# Patient Record
Sex: MALE | Race: WHITE | NOT HISPANIC OR LATINO
[De-identification: names, ages, dates, MRNs, and addresses within clinical notes are randomized per-mention and may not be internally consistent; named-entity substitution may affect disease eponyms.]

---

## 2018-11-24 PROBLEM — E03.9 HYPOTHYROIDISM: Status: ACTIVE | Noted: 2018-11-24

## 2018-11-24 PROBLEM — M87.051 AVASCULAR NECROSIS OF BONE OF RIGHT HIP: Status: RESOLVED | Noted: 2018-11-24 | Resolved: 2018-11-24

## 2018-11-24 PROBLEM — Z87.2 HISTORY OF ERYTHEMA MULTIFORME: Status: RESOLVED | Noted: 2018-11-24 | Resolved: 2018-11-24

## 2018-11-24 PROBLEM — Z87.438 HISTORY OF BPH: Status: RESOLVED | Noted: 2018-11-24 | Resolved: 2018-11-24

## 2018-11-24 PROBLEM — Z00.00 ENCOUNTER FOR PREVENTIVE HEALTH EXAMINATION: Status: ACTIVE | Noted: 2018-11-24

## 2018-11-24 RX ORDER — ZOLPIDEM TARTRATE 10 MG/1
10 TABLET ORAL
Refills: 0 | Status: ACTIVE | COMMUNITY

## 2018-11-24 RX ORDER — FLUTICASONE PROPIONATE 50 MCG
50 SPRAY, SUSPENSION NASAL TWICE DAILY
Refills: 0 | Status: ACTIVE | COMMUNITY

## 2018-11-24 RX ORDER — ALPRAZOLAM 1 MG/1
1 TABLET ORAL
Refills: 0 | Status: ACTIVE | COMMUNITY

## 2018-11-27 ENCOUNTER — NON-APPOINTMENT (OUTPATIENT)
Age: 63
End: 2018-11-27

## 2018-11-27 ENCOUNTER — APPOINTMENT (OUTPATIENT)
Dept: HEART AND VASCULAR | Facility: CLINIC | Age: 63
End: 2018-11-27
Payer: COMMERCIAL

## 2018-11-27 VITALS
BODY MASS INDEX: 22.39 KG/M2 | TEMPERATURE: 98.2 F | SYSTOLIC BLOOD PRESSURE: 106 MMHG | DIASTOLIC BLOOD PRESSURE: 64 MMHG | HEART RATE: 92 BPM | HEIGHT: 65.5 IN | OXYGEN SATURATION: 95 % | WEIGHT: 136 LBS

## 2018-11-27 DIAGNOSIS — F41.9 ANXIETY DISORDER, UNSPECIFIED: ICD-10-CM

## 2018-11-27 DIAGNOSIS — Z87.2 PERSONAL HISTORY OF DISEASES OF THE SKIN AND SUBCUTANEOUS TISSUE: ICD-10-CM

## 2018-11-27 DIAGNOSIS — F32.9 ANXIETY DISORDER, UNSPECIFIED: ICD-10-CM

## 2018-11-27 DIAGNOSIS — Z87.438 PERSONAL HISTORY OF OTHER DISEASES OF MALE GENITAL ORGANS: ICD-10-CM

## 2018-11-27 DIAGNOSIS — E03.9 HYPOTHYROIDISM, UNSPECIFIED: ICD-10-CM

## 2018-11-27 DIAGNOSIS — N40.0 BENIGN PROSTATIC HYPERPLASIA WITHOUT LOWER URINARY TRACT SYMPMS: ICD-10-CM

## 2018-11-27 DIAGNOSIS — M87.051 IDIOPATHIC ASEPTIC NECROSIS OF RIGHT FEMUR: ICD-10-CM

## 2018-11-27 PROCEDURE — 99214 OFFICE O/P EST MOD 30 MIN: CPT | Mod: 25

## 2018-11-27 PROCEDURE — 93000 ELECTROCARDIOGRAM COMPLETE: CPT

## 2018-11-27 RX ORDER — TAMSULOSIN HYDROCHLORIDE 0.4 MG/1
0.4 CAPSULE ORAL DAILY
Refills: 0 | Status: ACTIVE | COMMUNITY

## 2018-11-27 RX ORDER — AZELASTINE HYDROCHLORIDE 137 UG/1
0.1 SPRAY, METERED NASAL TWICE DAILY
Refills: 0 | Status: ACTIVE | COMMUNITY

## 2018-11-27 RX ORDER — ASCORBIC ACID 1000 MG
1000 TABLET, EXTENDED RELEASE ORAL
Refills: 0 | Status: ACTIVE | COMMUNITY

## 2018-11-27 RX ORDER — CHOLECALCIFEROL (VITAMIN D3) 50 MCG
5000 TABLET ORAL
Refills: 0 | Status: ACTIVE | COMMUNITY

## 2018-11-27 NOTE — REASON FOR VISIT
[Follow-Up - Clinic] : a clinic follow-up of [FreeTextEntry1] : \par Diagnostic tests:\par ---------------------------------------------\par ECG: \par 11/27/18: NSR, A-sensed, V-paced. \par 05/22/18: NSR, left bundle branch block. \par 01/03/17: NSR, left bundle branch block. \par 07/22/15: NSR, left bundle branch block.\par 04/14/15: NSR, left bundle branch block. \par 11/11/14: NSR, left bundle branch block. \par 02/20/13: NSR, left bundle branch block. \par ----------------------------------------------\par Echo: \par 06/01/17: EF 52%, trace AI, PPM.\par 07/10/15: outside lab: EF 60%, mild MR, trace AI, \par 03/04/13: EF 53%, trace TR\par 11/26/08: EF 60%, trace AI/TR\par ----------------------------------------------\par Stress:\par 07/10/15: at [facility]: regadenoson MPI stress: EF 60% normal perfusion\par 03/19/14: exercise stress echo: EF 55%, 10.6 METS, normal wall motion and PA pressures\par -----------------------------------------------\par Carotid:\par 07/10/15: ICA b/l 1-19%\par -----------------------------------------------\par Lower extremity veins:\par 01/03/17: sono: r/o DVT: left: normal study.\par -----------------------------------------------\par CT:\par 08/14/18: chest: bilateral peribronchial consolidation with bronchiectasis, ground glass opacities

## 2018-11-27 NOTE — PHYSICAL EXAM
[General Appearance - Well Developed] : well developed [Normal Appearance] : normal appearance [Well Groomed] : well groomed [General Appearance - Well Nourished] : well nourished [No Deformities] : no deformities [General Appearance - In No Acute Distress] : no acute distress [Normal Conjunctiva] : the conjunctiva exhibited no abnormalities [Eyelids - No Xanthelasma] : the eyelids demonstrated no xanthelasmas [Normal Oral Mucosa] : normal oral mucosa [No Oral Pallor] : no oral pallor [No Oral Cyanosis] : no oral cyanosis [Normal Jugular Venous A Waves Present] : normal jugular venous A waves present [Normal Jugular Venous V Waves Present] : normal jugular venous V waves present [No Jugular Venous Curtis A Waves] : no jugular venous curtis A waves [Respiration, Rhythm And Depth] : normal respiratory rhythm and effort [Exaggerated Use Of Accessory Muscles For Inspiration] : no accessory muscle use [Auscultation Breath Sounds / Voice Sounds] : lungs were clear to auscultation bilaterally [Heart Rate And Rhythm] : heart rate and rhythm were normal [Heart Sounds] : normal S1 and S2 [Murmurs] : no murmurs present [Abdomen Soft] : soft [Abdomen Tenderness] : non-tender [Abdomen Mass (___ Cm)] : no abdominal mass palpated [Abnormal Walk] : normal gait [Gait - Sufficient For Exercise Testing] : the gait was sufficient for exercise testing [Nail Clubbing] : no clubbing of the fingernails [Cyanosis, Localized] : no localized cyanosis [Petechial Hemorrhages (___cm)] : no petechial hemorrhages [Skin Color & Pigmentation] : normal skin color and pigmentation [] : no rash [No Venous Stasis] : no venous stasis [Skin Lesions] : no skin lesions [No Skin Ulcers] : no skin ulcer [No Xanthoma] : no  xanthoma was observed [Oriented To Time, Place, And Person] : oriented to person, place, and time [Affect] : the affect was normal [Mood] : the mood was normal [FreeTextEntry1] : + anxiety

## 2018-11-27 NOTE — ASSESSMENT
[FreeTextEntry1] :  \par 1. Sarcoidosis of lung:  Pulmonary sarcoidosis, with coincident left bundle branch block: s/p normal cardiac MRI and echocardiogram 12/08, normal repeat cardiac MRI 06/29/09, normal echo 03/04/13, normal cardiac MR, echo, stress MPI, cardiac CTA (07/2015). Follows with new pulmonologist Parmjit Lassiter  \par             - continue Symbicort\par             - continue Flonase Suspension, 50 MCG/ACT, 1 spray in each nostril\par             - f/u with pulmonologist, Parmjit Sevilla MD at [facility] [place]\par \par 2. Left bundle branch block: s/p syncope and + EPS with prolonged HV conduction, s/p PPM (Medtronic) 07/14/15 (Rosibel Freire MD at [facility]), + NSVT on recent interrogations. Echo 06/01/17: EF 52%, trace AI, PPM.\par             - follow up with PPM clinic at [facility]\par             - continue to monitor. \par \par 3. Dyslipidemia: \par             - continue Crestor 20mg po daily\par             - discussed with patient therapeutic lifestyle changes to improve lipid metabolism.\par             - recheck lipid profile fasting next draw\par \par 4. + TOBIN: ? sarcoidosis vs. cardiac etiology\par             - will send for an exercise stress echocardiogram

## 2018-11-27 NOTE — REVIEW OF SYSTEMS
[Dyspnea on exertion] : dyspnea during exertion [Cough] : cough [Anxiety] : anxiety [Negative] : Heme/Lymph [Shortness Of Breath] : shortness of breath

## 2018-11-27 NOTE — DISCUSSION/SUMMARY
[___ Month(s)] : [unfilled] month(s) [FreeTextEntry1] : Lior Lam MD, FACC, FASE, RPVI\par Director, Cardiovascular Service [facility]\par Director, Cardiology [facility]\par Associate , Cardiovascular Disease Fellowship\par [facility]\par Associate Professor of Medicine\par [employer] [facility]\par \par ([facility] office)\par [facility]\par [address], 3rd floor ([address] [address]\par (p) [phone]\par (f)  [phone]\par \par ([place] Office)\par [facility]\par [address]\par [address] [facility]\par (p) [phone]

## 2018-11-27 NOTE — HISTORY OF PRESENT ILLNESS
[FreeTextEntry1] : Mr. Houser presents for follow up and management of pulmonary sarcoidosis and left bundle branch block s/p PPM 07/14/15. He admits to a fair a mouth of stress at home. He and his wife have two adopted children from [place]. Both have had children without really being ready and his son has been diagnosed with bipolar disorder. On 07/09/15 he experienced a syncopal episode while standing in the kitchen. The episode was preceded by several seconds of light headedness. Then he awoke on the floor and felt back to his baseline other than a small head laceration. He then went to the ED at [facility] in [place]. He had a negative head CT and was observed for 24 hours. He had an echocardiogram, MPI stress test, CTA of the coronary arteries, and a carotid ultrasound all of which were negative per patient. He was then transferred to [facility] on Sunday secondary to LBBB and h/o sarcoidosis. He had a cardiac MR in 2016 which revealed an EF of 52% without evidence of scar or infiltration. He went on to have an EPS with Rosibel Freire MD which revealed prolongation of the HV interval with a procainamide challenge. He then went on to have a Medtronic A2DR01 (MRI compatible) dual chamber pacemaker implanted and is doing well since. His past device interrogation noted several episodes of NSVT. At present he denies any cardiovascular complaints. He continues to have episodes of "URI" and we discussed the need to follow up with his pulmonologist to rule out active pulmonary sarcoidosis. In addition, he continues to note a left sub-auricular lymph node enlargement. He underwent an echocardiogram on 06/01/17 which revealed EF 52%, trace AI, and a PPM. He is following with a pulmonologist in the sarcoidosis center at [place] and is now off prednisone.  In July, 2018 he noted his PPM pacing and his electrophysiologist reprogramed the pacemaker to allow for longer AV delays.  He has modified his diet and has lost 40 pounds as a result.

## 2018-12-04 ENCOUNTER — MEDICATION RENEWAL (OUTPATIENT)
Age: 63
End: 2018-12-04

## 2018-12-13 ENCOUNTER — MEDICATION RENEWAL (OUTPATIENT)
Age: 63
End: 2018-12-13

## 2019-01-07 ENCOUNTER — APPOINTMENT (OUTPATIENT)
Dept: HEART AND VASCULAR | Facility: CLINIC | Age: 64
End: 2019-01-07
Payer: COMMERCIAL

## 2019-01-07 PROCEDURE — 93351 STRESS TTE COMPLETE: CPT

## 2019-04-07 NOTE — REVIEW OF SYSTEMS
[Shortness Of Breath] : shortness of breath [Dyspnea on exertion] : dyspnea during exertion [Cough] : cough [Anxiety] : anxiety [Negative] : Heme/Lymph

## 2019-04-09 ENCOUNTER — APPOINTMENT (OUTPATIENT)
Dept: HEART AND VASCULAR | Facility: CLINIC | Age: 64
End: 2019-04-09
Payer: COMMERCIAL

## 2019-04-09 VITALS
DIASTOLIC BLOOD PRESSURE: 61 MMHG | SYSTOLIC BLOOD PRESSURE: 108 MMHG | OXYGEN SATURATION: 98 % | TEMPERATURE: 98 F | WEIGHT: 144 LBS | HEART RATE: 89 BPM | BODY MASS INDEX: 23.7 KG/M2 | HEIGHT: 65.5 IN

## 2019-04-09 PROCEDURE — 99214 OFFICE O/P EST MOD 30 MIN: CPT

## 2019-04-09 RX ORDER — DIAZEPAM 10 MG/1
10 TABLET ORAL DAILY
Qty: 15 | Refills: 2 | Status: ACTIVE | COMMUNITY
Start: 2018-11-27 | End: 1900-01-01

## 2019-04-09 NOTE — HISTORY OF PRESENT ILLNESS
[FreeTextEntry1] : Mr. Houser presents for follow up and management of pulmonary sarcoidosis and left bundle branch block s/p PPM 07/14/15. He admits to a fair a mouth of stress at home. He and his wife have two adopted children from [place]. Both have had children without really being ready and his son has been diagnosed with bipolar disorder. On 07/09/15 he experienced a syncopal episode while standing in the kitchen. The episode was preceded by several seconds of light headedness. Then he awoke on the floor and felt back to his baseline other than a small head laceration. He then went to the ED at [facility] in [place]. He had a negative head CT and was observed for 24 hours. He had an echocardiogram, MPI stress test, CTA of the coronary arteries, and a carotid ultrasound all of which were negative per patient. He was then transferred to [facility] on Sunday secondary to LBBB and h/o sarcoidosis. He had a cardiac MR in 2016 which revealed an EF of 52% without evidence of scar or infiltration. He went on to have an EPS with Rosibel Freire MD which revealed prolongation of the HV interval with a procainamide challenge. He then went on to have a Medtronic A2DR01 (MRI compatible) dual chamber pacemaker implanted and is doing well since. His past device interrogation noted several episodes of NSVT. At present he denies any cardiovascular complaints. He continues to have episodes of "URI" and we discussed the need to follow up with his pulmonologist to rule out active pulmonary sarcoidosis. In addition, he continues to note a left sub-auricular lymph node enlargement. He underwent an echocardiogram on 06/01/17 which revealed EF 52%, trace AI, and a PPM. He is following with a pulmonologist in the sarcoidosis center at [place] and is now off prednisone.  In July, 2018 he noted his PPM pacing and his electrophysiologist reprogramed the pacemaker to allow for longer AV delays.  He has modified his diet and has lost 40 pounds as a result.  He had an exercise stress echocardiogram on\par 01/07/19 which revealed an EF of 58%, PPM, aortic sclerosis, 10.1 METS, and normal wall motion and PA pressures.

## 2019-04-09 NOTE — REASON FOR VISIT
[Follow-Up - Clinic] : a clinic follow-up of [FreeTextEntry1] : Diagnostic Tests:\par ---------------------------------------------\par ECG: \par 11/27/18: NSR, A-sensed, V-paced. \par 05/22/18: NSR, left bundle branch block. \par 01/03/17: NSR, left bundle branch block. \par 07/22/15: NSR, left bundle branch block.\par 04/14/15: NSR, left bundle branch block. \par 11/11/14: NSR, left bundle branch block. \par 02/20/13: NSR, left bundle branch block. \par ----------------------------------------------\par Echo: \par 01/07/19: EF 58%, aortic sclerosis, PPM.\par 06/01/17: EF 52%, trace AI, PPM.\par 07/10/15: outside lab: EF 60%, mild MR, trace AI, \par 03/04/13: EF 53%, trace TR\par 11/26/08: EF 60%, trace AI/TR\par ----------------------------------------------\par Stress:\par 01/07/19: exercise stress echo: EF 58%, PPM, aortic sclerosis, 10.1 METS, normal wall motion and PA pressures.\par 07/10/15: at [facility]: regadenoson MPI stress: EF 60% normal perfusion\par 03/19/14: exercise stress echo: EF 55%, 10.6 METS, normal wall motion and PA pressures\par -----------------------------------------------\par Carotid:\par 07/10/15: ICA b/l 1-19%\par -----------------------------------------------\par Lower extremity veins:\par 01/03/17: sono: r/o DVT: left: normal study.\par -----------------------------------------------\par CT:\par 08/14/18: chest: bilateral peribronchial consolidation with bronchiectasis, ground glass opacities

## 2019-04-09 NOTE — PHYSICAL EXAM
[General Appearance - Well Developed] : well developed [Normal Appearance] : normal appearance [Well Groomed] : well groomed [General Appearance - Well Nourished] : well nourished [No Deformities] : no deformities [General Appearance - In No Acute Distress] : no acute distress [Normal Conjunctiva] : the conjunctiva exhibited no abnormalities [Eyelids - No Xanthelasma] : the eyelids demonstrated no xanthelasmas [Normal Oral Mucosa] : normal oral mucosa [No Oral Pallor] : no oral pallor [No Oral Cyanosis] : no oral cyanosis [Normal Jugular Venous A Waves Present] : normal jugular venous A waves present [Normal Jugular Venous V Waves Present] : normal jugular venous V waves present [No Jugular Venous Curtis A Waves] : no jugular venous curtis A waves [Respiration, Rhythm And Depth] : normal respiratory rhythm and effort [Exaggerated Use Of Accessory Muscles For Inspiration] : no accessory muscle use [Heart Rate And Rhythm] : heart rate and rhythm were normal [Heart Sounds] : normal S1 and S2 [Murmurs] : no murmurs present [Abdomen Soft] : soft [Abdomen Tenderness] : non-tender [Abdomen Mass (___ Cm)] : no abdominal mass palpated [Abnormal Walk] : normal gait [Gait - Sufficient For Exercise Testing] : the gait was sufficient for exercise testing [Nail Clubbing] : no clubbing of the fingernails [Cyanosis, Localized] : no localized cyanosis [Petechial Hemorrhages (___cm)] : no petechial hemorrhages [Skin Color & Pigmentation] : normal skin color and pigmentation [] : no rash [No Venous Stasis] : no venous stasis [Skin Lesions] : no skin lesions [No Skin Ulcers] : no skin ulcer [No Xanthoma] : no  xanthoma was observed [Oriented To Time, Place, And Person] : oriented to person, place, and time [Affect] : the affect was normal [Mood] : the mood was normal [FreeTextEntry1] : + anxiety

## 2019-04-09 NOTE — ASSESSMENT
[FreeTextEntry1] : 1. Sarcoidosis of lung:  Pulmonary sarcoidosis, with coincident left bundle branch block: s/p normal cardiac MRI and echocardiogram 12/08, normal repeat cardiac MRI 06/29/09, normal echo 03/04/13, normal cardiac MR, echo, stress MPI, cardiac CTA (07/2015). Follows with new pulmonologist Parmjit Lassiter  \par             - continue Symbicort\par             - continue Flonase Suspension, 50 MCG/ACT, 1 spray in each nostril\par             - f/u with pulmonologist, Parmjit Se[place]illa [place] [facility]\[place] \par 2. Left bundle branch block: s/p syncope and + EPS with prolonged HV conduction, s/p PPM (Medtronic) 07/14/15 (Rosibel Freire MD at [facility]), + NSVT on recent interrogations. Echo 06/01/17: EF 52%, trace AI, PPM.\par             - follow up with PPM clinic at [facility]\par             - continue to monitor. \par \par 3. Dyslipidemia: lab work from 01/2019 had an LDL of 165 but he was off Crestor at the time\par             - continue Crestor 20mg po daily\par             - discussed with patient therapeutic lifestyle changes to improve lipid metabolism.\par             - recheck lipid profile with PMD in near future\par \par 4. + TOBIN: ? sarcoidosis vs. cardiac etiology, s/p 01/07/19: exercise stress echo: EF 58%, PPM, aortic sclerosis, 10.1 METS, normal wall motion and PA pressures.\par             - f/u with pulmonologist

## 2019-08-26 ENCOUNTER — APPOINTMENT (OUTPATIENT)
Dept: HEART AND VASCULAR | Facility: CLINIC | Age: 64
End: 2019-08-26
Payer: COMMERCIAL

## 2019-08-26 ENCOUNTER — NON-APPOINTMENT (OUTPATIENT)
Age: 64
End: 2019-08-26

## 2019-08-26 VITALS
DIASTOLIC BLOOD PRESSURE: 64 MMHG | HEIGHT: 66.5 IN | BODY MASS INDEX: 24.14 KG/M2 | OXYGEN SATURATION: 95 % | WEIGHT: 152 LBS | TEMPERATURE: 98.3 F | SYSTOLIC BLOOD PRESSURE: 111 MMHG | HEART RATE: 93 BPM

## 2019-08-26 DIAGNOSIS — D69.3 IMMUNE THROMBOCYTOPENIC PURPURA: ICD-10-CM

## 2019-08-26 PROCEDURE — 99214 OFFICE O/P EST MOD 30 MIN: CPT | Mod: 25

## 2019-08-26 PROCEDURE — 93000 ELECTROCARDIOGRAM COMPLETE: CPT

## 2019-08-26 NOTE — ASSESSMENT
[FreeTextEntry1] : 1. Sarcoidosis of lung:  Pulmonary sarcoidosis, with coincident left bundle branch block: s/p normal cardiac MRI and echocardiogram 12/08, normal repeat cardiac MRI 06/29/09, normal echo 03/04/13, normal cardiac MR, echo, stress MPI, cardiac CTA (07/2015). Follows with new pulmonologist Parmjit Lassiter  \par             - continue Symbicort\par             - continue Flonase Suspension, 50 MCG/ACT, 1 spray in each nostril\par             - f/u with pulmonologist, Parmjit [place] [facility]\[place] \par 2. Left bundle branch block: s/p syncope and + EPS with prolonged HV conduction, s/p PPM (Medtronic) 07/14/15 (Rosibel Freire MD at [facility]), + NSVT on recent interrogations. Echo 06/01/17: EF 52%, trace AI, PPM.\par             - follow up with PPM clinic at [facility]\par             - continue to monitor. \par \par 3. Dyslipidemia: lab work from 01/2019 had an LDL of 165 but he was off Crestor at the time, now with optimal LDL\par             - continue Crestor 20mg po daily\par             - discussed with patient therapeutic lifestyle changes to improve lipid metabolism.\par             - consider trial of half dose Vascepa\par \par 4. + TOBIN: ? sarcoidosis vs. cardiac etiology, s/p 01/07/19: exercise stress echo: EF 58%, PPM, aortic sclerosis, 10.1 METS, normal wall motion and PA pressures.\par             - f/u with pulmonologist

## 2019-08-26 NOTE — PHYSICAL EXAM
[General Appearance - Well Developed] : well developed [Well Groomed] : well groomed [Normal Appearance] : normal appearance [No Deformities] : no deformities [General Appearance - In No Acute Distress] : no acute distress [General Appearance - Well Nourished] : well nourished [Normal Conjunctiva] : the conjunctiva exhibited no abnormalities [Eyelids - No Xanthelasma] : the eyelids demonstrated no xanthelasmas [No Oral Cyanosis] : no oral cyanosis [Normal Oral Mucosa] : normal oral mucosa [No Oral Pallor] : no oral pallor [Normal Jugular Venous V Waves Present] : normal jugular venous V waves present [Normal Jugular Venous A Waves Present] : normal jugular venous A waves present [No Jugular Venous Curtis A Waves] : no jugular venous curtis A waves [Exaggerated Use Of Accessory Muscles For Inspiration] : no accessory muscle use [Respiration, Rhythm And Depth] : normal respiratory rhythm and effort [Heart Rate And Rhythm] : heart rate and rhythm were normal [Heart Sounds] : normal S1 and S2 [Murmurs] : no murmurs present [Abdomen Soft] : soft [Abdomen Tenderness] : non-tender [Abnormal Walk] : normal gait [Gait - Sufficient For Exercise Testing] : the gait was sufficient for exercise testing [Abdomen Mass (___ Cm)] : no abdominal mass palpated [Cyanosis, Localized] : no localized cyanosis [Nail Clubbing] : no clubbing of the fingernails [Skin Color & Pigmentation] : normal skin color and pigmentation [Petechial Hemorrhages (___cm)] : no petechial hemorrhages [No Venous Stasis] : no venous stasis [] : no rash [Skin Lesions] : no skin lesions [No Xanthoma] : no  xanthoma was observed [No Skin Ulcers] : no skin ulcer [Affect] : the affect was normal [Oriented To Time, Place, And Person] : oriented to person, place, and time [Mood] : the mood was normal [FreeTextEntry1] : + anxiety

## 2019-08-26 NOTE — HISTORY OF PRESENT ILLNESS
[FreeTextEntry1] : Mr. Houser presents for follow up and management of pulmonary sarcoidosis and left bundle branch block s/p PPM 07/14/15. He admits to a fair a mouth of stress at home. He and his wife have two adopted children from [place]. Both have had children without really being ready and his son has been diagnosed with bipolar disorder. On 07/09/15 he experienced a syncopal episode while standing in the kitchen. The episode was preceded by several seconds of light headedness. Then he awoke on the floor and felt back to his baseline other than a small head laceration. He then went to the ED at [facility] in [place]. He had a negative head CT and was observed for 24 hours. He had an echocardiogram, MPI stress test, CTA of the coronary arteries, and a carotid ultrasound all of which were negative per patient. He was then transferred to [facility] on Sunday secondary to LBBB and h/o sarcoidosis. He had a cardiac MR in 2016 which revealed an EF of 52% without evidence of scar or infiltration. He went on to have an EPS with Rosibel Freire MD which revealed prolongation of the HV interval with a procainamide challenge. He then went on to have a Medtronic A2DR01 (MRI compatible) dual chamber pacemaker implanted and is doing well since. His past device interrogation noted several episodes of NSVT. At present he denies any cardiovascular complaints. He continues to have episodes of "URI" and we discussed the need to follow up with his pulmonologist to rule out active pulmonary sarcoidosis. In addition, he continues to note a left sub-auricular lymph node enlargement. He underwent an echocardiogram on 06/01/17 which revealed EF 52%, trace AI, and a PPM. He is following with a pulmonologist in the sarcoidosis center at [place] and is now off prednisone.  In July, 2018 he noted his PPM pacing and his electrophysiologist reprogramed the pacemaker to allow for longer AV delays.  He has modified his diet and has lost 40 pounds as a result.  He had an exercise stress echocardiogram on\par 01/07/19 which revealed an EF of 58%, PPM, aortic sclerosis, 10.1 METS, and normal wall motion and PA pressures.  He has complaints of sinus congestion.  His PMD initiated Vascepa and he discontinued it secondary to hand arthralgia.

## 2019-08-26 NOTE — REASON FOR VISIT
[Follow-Up - Clinic] : a clinic follow-up of [FreeTextEntry1] : Diagnostic Tests:\par ---------------------------------------------\par ECG: \par 08/26/19: NSR, A-sensed, V-paced. \par 11/27/18: NSR, A-sensed, V-paced. \par 05/22/18: NSR, left bundle branch block. \par 01/03/17: NSR, left bundle branch block. \par 07/22/15: NSR, left bundle branch block.\par 04/14/15: NSR, left bundle branch block. \par 11/11/14: NSR, left bundle branch block. \par 02/20/13: NSR, left bundle branch block. \par ----------------------------------------------\par Echo: \par 01/07/19: EF 58%, aortic sclerosis, PPM.\par 06/01/17: EF 52%, trace AI, PPM.\par 07/10/15: outside lab: EF 60%, mild MR, trace AI, \par 03/04/13: EF 53%, trace TR\par 11/26/08: EF 60%, trace AI/TR\par ----------------------------------------------\par Stress:\par 01/07/19: exercise stress echo: EF 58%, PPM, aortic sclerosis, 10.1 METS, normal wall motion and PA pressures.\par 07/10/15: at [place]: regadenoson MPI stress: EF 60% normal perfusion\par 03/19/14: exercise stress echo: EF 55%, 10.6 METS, normal wall motion and PA pressures\par -----------------------------------------------\par Carotid:\par 07/10/15: ICA b/l 1-19%\par -----------------------------------------------\par Lower extremity veins:\par 01/03/17: sono: r/o DVT: left: normal study.\par -----------------------------------------------\par CT:\par 08/14/18: chest: bilateral peribronchial consolidation with bronchiectasis, ground glass opacities

## 2019-11-26 ENCOUNTER — APPOINTMENT (OUTPATIENT)
Dept: HEART AND VASCULAR | Facility: CLINIC | Age: 64
End: 2019-11-26
Payer: COMMERCIAL

## 2019-11-26 VITALS
HEART RATE: 82 BPM | WEIGHT: 153 LBS | SYSTOLIC BLOOD PRESSURE: 104 MMHG | DIASTOLIC BLOOD PRESSURE: 64 MMHG | OXYGEN SATURATION: 96 % | BODY MASS INDEX: 24.3 KG/M2 | HEIGHT: 66.5 IN

## 2019-11-26 DIAGNOSIS — K21.9 GASTRO-ESOPHAGEAL REFLUX DISEASE W/OUT ESOPHAGITIS: ICD-10-CM

## 2019-11-26 PROCEDURE — 99214 OFFICE O/P EST MOD 30 MIN: CPT

## 2019-11-26 NOTE — ASSESSMENT
[FreeTextEntry1] : 1. Sarcoidosis of lung:  Pulmonary sarcoidosis, with coincident left bundle branch block: s/p normal cardiac MRI and echocardiogram 12/08, normal repeat cardiac MRI 06/29/09, normal echo 03/04/13, normal cardiac MR, echo, stress MPI, cardiac CTA (07/2015). Follows with new pulmonologist Parmjit Lassitre  \par             - continue Symbicort\par             - continue Flonase Suspension, 50 MCG/ACT, 1 spray in each nostril\par             - f/u with pulmonologist, [place] [facility]\[place] \par 2. Left bundle branch block: s/p syncope and + EPS with prolonged HV conduction, s/p PPM (Medtronic) 07/14/15 (Rosibel Freire MD at [facility]), + NSVT on recent interrogations. Echo 06/01/17: EF 52%, trace AI, PPM.\par             - follow up with PPM clinic at [facility]\par             - continue to monitor. \par \par 3. Dyslipidemia: lab work from 01/2019 had an LDL of 165 but he was off Crestor at the time, now with optimal LDL\par             - continue Crestor 20mg po daily\par             - discussed with patient therapeutic lifestyle changes to improve lipid metabolism.\par             - consider trial of half dose Vascepa\par \par 4. + TOBIN: ? sarcoidosis vs. cardiac etiology, s/p 01/07/19: exercise stress echo: EF 58%, PPM, aortic sclerosis, 10.1 METS, normal wall motion and PA pressures.\par             - f/u with pulmonologist

## 2019-11-26 NOTE — REASON FOR VISIT
[FreeTextEntry1] : Diagnostic Tests:\par ---------------------------------------------\par ECG: \par 08/26/19: NSR, A-sensed, V-paced. \par 11/27/18: NSR, A-sensed, V-paced. \par 05/22/18: NSR, left bundle branch block. \par 01/03/17: NSR, left bundle branch block. \par 07/22/15: NSR, left bundle branch block.\par 04/14/15: NSR, left bundle branch block. \par 11/11/14: NSR, left bundle branch block. \par 02/20/13: NSR, left bundle branch block. \par ----------------------------------------------\par Echo: \par 01/07/19: EF 58%, aortic sclerosis, PPM.\par 06/01/17: EF 52%, trace AI, PPM.\par 07/10/15: outside lab: EF 60%, mild MR, trace AI, \par 03/04/13: EF 53%, trace TR\par 11/26/08: EF 60%, trace AI/TR\par ----------------------------------------------\par Stress:\par 01/07/19: exercise stress echo: EF 58%, PPM, aortic sclerosis, 10.1 METS, normal wall motion and PA pressures.\par 07/10/15: at [facility]: regadenoson MPI stress: EF 60% normal perfusion\par 03/19/14: exercise stress echo: EF 55%, 10.6 METS, normal wall motion and PA pressures\par -----------------------------------------------\par Carotid:\par 07/10/15: ICA b/l 1-19%\par -----------------------------------------------\par Lower extremity veins:\par 01/03/17: sono: r/o DVT: left: normal study.\par -----------------------------------------------\par CT:\par 08/14/18: chest: bilateral peribronchial consolidation with bronchiectasis, ground glass opacities

## 2019-11-26 NOTE — HISTORY OF PRESENT ILLNESS
[FreeTextEntry1] : Mr. Houser presents for follow up and management of pulmonary sarcoidosis and left bundle branch block s/p PPM 07/14/15. He admits to a fair a mouth of stress at home. He and his wife have two adopted children from [place]. Both have had children without really being ready and his son has been diagnosed with bipolar disorder. On 07/09/15 he experienced a syncopal episode while standing in the kitchen. The episode was preceded by several seconds of light headedness. Then he awoke on the floor and felt back to his baseline other than a small head laceration. He then went to the ED at [facility] in [place]. He had a negative head CT and was observed for 24 hours. He had an echocardiogram, MPI stress test, CTA of the coronary arteries, and a carotid ultrasound all of which were negative per patient. He was then transferred to [facility] on Sunday secondary to LBBB and h/o sarcoidosis. He had a cardiac MR in 2016 which revealed an EF of 52% without evidence of scar or infiltration. He went on to have an EPS with Rosibel Freire MD which revealed prolongation of the HV interval with a procainamide challenge. He then went on to have a Medtronic A2DR01 (MRI compatible) dual chamber pacemaker implanted and is doing well since. His past device interrogation noted several episodes of NSVT. At present he denies any cardiovascular complaints. He continues to have episodes of "URI" and we discussed the need to follow up with his pulmonologist to rule out active pulmonary sarcoidosis. In addition, he continues to note a left sub-auricular lymph node enlargement. He underwent an echocardiogram on 06/01/17 which revealed EF 52%, trace AI, and a PPM. He is following with a pulmonologist in the sarcoidosis center at [place] and is now off prednisone.  In July, 2018 he noted his PPM pacing and his electrophysiologist reprogramed the pacemaker to allow for longer AV delays.  He has modified his diet and has lost 40 pounds as a result.  He had an exercise stress echocardiogram on\par 01/07/19 which revealed an EF of 58%, PPM, aortic sclerosis, 10.1 METS, and normal wall motion and PA pressures.  He has complaints of sinus congestion.  His PMD initiated Vascepa and he discontinued it secondary to hand arthralgia.

## 2020-01-03 ENCOUNTER — RX RENEWAL (OUTPATIENT)
Age: 65
End: 2020-01-03

## 2020-01-03 RX ORDER — ROSUVASTATIN CALCIUM 20 MG/1
20 TABLET, FILM COATED ORAL DAILY
Qty: 90 | Refills: 3 | Status: ACTIVE | COMMUNITY
Start: 1900-01-01 | End: 1900-01-01

## 2020-02-25 ENCOUNTER — NON-APPOINTMENT (OUTPATIENT)
Age: 65
End: 2020-02-25

## 2020-02-25 ENCOUNTER — APPOINTMENT (OUTPATIENT)
Dept: HEART AND VASCULAR | Facility: CLINIC | Age: 65
End: 2020-02-25
Payer: COMMERCIAL

## 2020-02-25 VITALS
SYSTOLIC BLOOD PRESSURE: 109 MMHG | WEIGHT: 156 LBS | DIASTOLIC BLOOD PRESSURE: 70 MMHG | HEIGHT: 66.5 IN | BODY MASS INDEX: 24.78 KG/M2 | OXYGEN SATURATION: 95 % | HEART RATE: 97 BPM

## 2020-02-25 PROCEDURE — 99214 OFFICE O/P EST MOD 30 MIN: CPT | Mod: 25

## 2020-02-25 PROCEDURE — 93000 ELECTROCARDIOGRAM COMPLETE: CPT

## 2020-02-26 NOTE — ASSESSMENT
[FreeTextEntry1] : 1. Sarcoidosis of lung:  Pulmonary sarcoidosis, with coincident left bundle branch block: s/p normal cardiac MRI and echocardiogram 12/08, normal repeat cardiac MRI 06/29/09, normal echo 03/04/13, normal cardiac MR, echo, stress MPI, cardiac CTA (07/2015). Follows with new pulmonologist Parmjit Lassiter  \par             - continue Symbicort\par             - continue Flonase Suspension, 50 MCG/ACT, 1 spray in each nostril\par             - [place] [facility] [place] \par 2. Left bundle branch block: s/p syncope and + EPS with prolonged HV conduction, s/p PPM (Medtronic) 07/14/15 (Rosibel Freire MD at [facility]), + NSVT on recent interrogations. Echo 06/01/17: EF 52%, trace AI, PPM.\par             - follow up with PPM clinic at [facility]\par             - continue to monitor. \par             - will send for an echocardiogram \par \par 3. Dyslipidemia: lab work from 01/2019 had an LDL of 165 but he was off Crestor at the time, now with optimal LDL\par             - continue Crestor 20mg po daily\par             - discussed with patient therapeutic lifestyle changes to improve lipid metabolism.\par \par 4. + TOBIN: ? sarcoidosis vs. cardiac etiology, s/p 01/07/19: exercise stress echo: EF 58%, PPM, aortic sclerosis, 10.1 METS, normal wall motion and PA pressures.\par             - f/u with pulmonologist

## 2020-03-04 ENCOUNTER — NEW PATIENT (OUTPATIENT)
Dept: URBAN - METROPOLITAN AREA CLINIC 46 | Facility: CLINIC | Age: 65
End: 2020-03-04

## 2020-03-04 DIAGNOSIS — H04.123: ICD-10-CM

## 2020-03-04 DIAGNOSIS — H43.812: ICD-10-CM

## 2020-03-04 DIAGNOSIS — H25.13: ICD-10-CM

## 2020-03-04 DIAGNOSIS — H18.51: ICD-10-CM

## 2020-03-04 DIAGNOSIS — D86.9: ICD-10-CM

## 2020-03-04 PROCEDURE — 99243 OFF/OP CNSLTJ NEW/EST LOW 30: CPT

## 2020-03-04 PROCEDURE — 92201 OPSCPY EXTND RTA DRAW UNI/BI: CPT

## 2020-03-04 ASSESSMENT — VISUAL ACUITY
OD_CC: 20/25
OS_CC: 20/60
OD_CC: 20/40
OS_CC: 20/30

## 2020-03-04 ASSESSMENT — TONOMETRY
OD_IOP_MMHG: 15
OS_IOP_MMHG: 15

## 2020-04-17 NOTE — HISTORY OF PRESENT ILLNESS
[FreeTextEntry1] : Mr. Houser presents for follow up and management of pulmonary sarcoidosis and left bundle branch block s/p PPM 07/14/15. He admits to a fair a mouth of stress at home. He and his wife have two adopted children from [place]. Both have had children without really being ready and his son has been diagnosed with bipolar disorder. On 07/09/15 he experienced a syncopal episode while standing in the kitchen. The episode was preceded by several seconds of light headedness. Then he awoke on the floor and felt back to his baseline other than a small head laceration. He then went to the ED at [facility] in [place]. He had a negative head CT and was observed for 24 hours. He had an echocardiogram, MPI stress test, CTA of the coronary arteries, and a carotid ultrasound all of which were negative per patient. He was then transferred to [facility] on Sunday secondary to LBBB and h/o sarcoidosis. He had a cardiac MR in 2016 which revealed an EF of 52% without evidence of scar or infiltration. He went on to have an EPS with Rosibel Freire MD which revealed prolongation of the HV interval with a procainamide challenge. He then went on to have a Medtronic A2DR01 (MRI compatible) dual chamber pacemaker implanted and is doing well since. His past device interrogation noted several episodes of NSVT. At present he denies any cardiovascular complaints. He continues to have episodes of "URI" and we discussed the need to follow up with his pulmonologist to rule out active pulmonary sarcoidosis. In addition, he continues to note a left sub-auricular lymph node enlargement. He underwent an echocardiogram on 06/01/17 which revealed EF 52%, trace AI, and a PPM. He is following with a pulmonologist in the sarcoidosis center at [place] and is now off prednisone.  In July, 2018 he noted his PPM pacing and his electrophysiologist reprogramed the pacemaker to allow for longer AV delays.  He has modified his diet and has lost 40 pounds as a result.  He had an exercise stress echocardiogram on\par 01/07/19 which revealed an EF of 58%, PPM, aortic sclerosis, 10.1 METS, and normal wall motion and PA pressures.  He has complaints of chronic sinus congestion.  [facility]

## 2020-06-01 ENCOUNTER — APPOINTMENT (OUTPATIENT)
Dept: HEART AND VASCULAR | Facility: CLINIC | Age: 65
End: 2020-06-01
Payer: COMMERCIAL

## 2020-06-01 VITALS — HEIGHT: 66.5 IN | WEIGHT: 155 LBS | BODY MASS INDEX: 24.62 KG/M2

## 2020-06-01 PROCEDURE — 99214 OFFICE O/P EST MOD 30 MIN: CPT | Mod: 95

## 2020-06-01 NOTE — PHYSICAL EXAM
[General Appearance - Well Developed] : well developed [Normal Appearance] : normal appearance [Well Groomed] : well groomed [General Appearance - Well Nourished] : well nourished [No Deformities] : no deformities [Eyelids - No Xanthelasma] : the eyelids demonstrated no xanthelasmas [Normal Conjunctiva] : the conjunctiva exhibited no abnormalities [General Appearance - In No Acute Distress] : no acute distress [Normal Oral Mucosa] : normal oral mucosa [No Oral Pallor] : no oral pallor [No Oral Cyanosis] : no oral cyanosis [Normal Jugular Venous V Waves Present] : normal jugular venous V waves present [Normal Jugular Venous A Waves Present] : normal jugular venous A waves present [No Jugular Venous Curtis A Waves] : no jugular venous curtis A waves [Exaggerated Use Of Accessory Muscles For Inspiration] : no accessory muscle use [Respiration, Rhythm And Depth] : normal respiratory rhythm and effort [Heart Rate And Rhythm] : heart rate and rhythm were normal [Heart Sounds] : normal S1 and S2 [Murmurs] : no murmurs present [Abdomen Soft] : soft [Abdomen Tenderness] : non-tender [Abdomen Mass (___ Cm)] : no abdominal mass palpated [Abnormal Walk] : normal gait [Gait - Sufficient For Exercise Testing] : the gait was sufficient for exercise testing [Nail Clubbing] : no clubbing of the fingernails [Cyanosis, Localized] : no localized cyanosis [Petechial Hemorrhages (___cm)] : no petechial hemorrhages [Skin Color & Pigmentation] : normal skin color and pigmentation [] : no rash [No Venous Stasis] : no venous stasis [Skin Lesions] : no skin lesions [No Skin Ulcers] : no skin ulcer [No Xanthoma] : no  xanthoma was observed [Oriented To Time, Place, And Person] : oriented to person, place, and time [Affect] : the affect was normal [Mood] : the mood was normal [FreeTextEntry1] : + anxiety

## 2020-06-01 NOTE — HISTORY OF PRESENT ILLNESS
[FreeTextEntry1] : Mr. Houser presents for follow up and management of pulmonary sarcoidosis and left bundle branch block s/p PPM 07/14/15. He admits to a fair a mouth of stress at home. He and his wife have two adopted children from [place]. Both have had children without really being ready and his son has been diagnosed with bipolar disorder. On 07/09/15 he experienced a syncopal episode while standing in the kitchen. The episode was preceded by several seconds of light headedness. Then he awoke on the floor and felt back to his baseline other than a small head laceration. He then went to the ED at [facility] in [place]. He had a negative head CT and was observed for 24 hours. He had an echocardiogram, MPI stress test, CTA of the coronary arteries, and a carotid ultrasound all of which were negative per patient. He was then transferred to [facility] on Sunday secondary to LBBB and h/o sarcoidosis. He had a cardiac MR in 2016 which revealed an EF of 52% without evidence of scar or infiltration. He went on to have an EPS with Rosibel Freire MD which revealed prolongation of the HV interval with a procainamide challenge. He then went on to have a Medtronic A2DR01 (MRI compatible) dual chamber pacemaker implanted and is doing well since. His past device interrogation noted several episodes of NSVT. At present he denies any cardiovascular complaints. He continues to have episodes of "URI" and we discussed the need to follow up with his pulmonologist to rule out active pulmonary sarcoidosis. In addition, he continues to note a left sub-auricular lymph node enlargement. He underwent an echocardiogram on 06/01/17 which revealed EF 52%, trace AI, and a PPM. He is following with a pulmonologist in the sarcoidosis center at [address] and is now off prednisone.  In July, 2018 he noted his PPM pacing and his electrophysiologist reprogramed the pacemaker to allow for longer AV delays.  He has modified his diet and has lost 40 pounds as a result.  He had an exercise stress echocardiogram on\par 01/07/19 which revealed an EF of 58%, PPM, aortic sclerosis, 10.1 METS, and normal wall motion and PA pressures.  He has complaints of chronic sinus congestion. He has been doing well during the COVID crisis.

## 2020-06-01 NOTE — REASON FOR VISIT
[Follow-Up - Clinic] : a clinic follow-up of [FreeTextEntry1] : Diagnostic Tests:\par ---------------------------------------------\par ECG: \par 02/25/20: sinus rhythm, A-paced, V-sensed. \par 08/26/19: NSR, A-sensed, V-paced. \par 11/27/18: NSR, A-sensed, V-paced. \par 05/22/18: NSR, left bundle branch block. \par 01/03/17: NSR, left bundle branch block. \par 07/22/15: NSR, left bundle branch block.\par 04/14/15: NSR, left bundle branch block. \par 11/11/14: NSR, left bundle branch block. \par 02/20/13: NSR, left bundle branch block. \par ----------------------------------------------\par Echo: \par 01/07/19: EF 58%, aortic sclerosis, PPM.\par 06/01/17: EF 52%, trace AI, PPM.\par 07/10/15: outside lab: EF 60%, mild MR, trace AI, \par 03/04/13: EF 53%, trace TR\par 11/26/08: EF 60%, trace AI/TR\par ----------------------------------------------\par Stress:\par 01/07/19: exercise stress echo: EF 58%, PPM, aortic sclerosis, 10.1 METS, normal wall motion and PA pressures.\par 07/10/15: at [facility]: regadenoson MPI stress: EF 60% normal perfusion\par 03/19/14: exercise stress echo: EF 55%, 10.6 METS, normal wall motion and PA pressures\par -----------------------------------------------\par Carotid:\par 07/10/15: ICA b/l 1-19%\par -----------------------------------------------\par Lower extremity veins:\par 01/03/17: sono: r/o DVT: left: normal study.\par -----------------------------------------------\par CT:\par 08/14/18: chest: bilateral peribronchial consolidation with bronchiectasis, ground glass opacities

## 2020-06-01 NOTE — ASSESSMENT
[FreeTextEntry1] : 1. Sarcoidosis of lung:  Pulmonary sarcoidosis, with coincident left bundle branch block: s/p normal cardiac MRI and echocardiogram 12/08, normal repeat cardiac MRI 06/29/09, normal echo 03/04/13, normal cardiac MR, echo, stress MPI, cardiac CTA (07/2015). Follows with new pulmonologist Parmjit Lassiter  \par             - continue Symbicort\par             - continue Flonase Suspension, 50 MCG/ACT, 1 spray in each nostril\par             - f/u with pulmonologist, [place] [facility] [place]\par \par 2. Left bundle branch block: s/p syncope and + EPS with prolonged HV conduction, s/p PPM (Medtronic) 07/14/15 (Rosibel Freire MD at [facility]), + NSVT on recent interrogations. Echo 06/01/17: EF 52%, trace AI, PPM.\par             - follow up with PPM clinic at [facility]\par             - continue to monitor. \par             - will send for an echocardiogram \par \par 3. Dyslipidemia: lab work from 01/2019 had an LDL of 165 but he was off Crestor at the time, now with optimal LDL\par             - continue Crestor 20mg po daily\par             - discussed with patient therapeutic lifestyle changes to improve lipid metabolism.\par \par 4. + TOBIN: ? sarcoidosis vs. cardiac etiology, s/p 01/07/19: exercise stress echo: EF 58%, PPM, aortic sclerosis, 10.1 METS, normal wall motion and PA pressures.\par             - f/u with pulmonologist\par \par Due to the current global COVID-19 pandemic and the recommendations for social distancing this encounter was converted from an in-person face-to-face encounter to a telehealth encounter employing the [employer], [employer] ?, or other approved audio/video platform.  All components of the evaluation and management were performed per clinical routine with the exception of the physical exam.  The physical exam references my most recent physical exam plus any additional information provided by the patient (i.e. ambulatory vitals/weight) or inspection from the video portion of the encounter. \par \par I spent in excess of 25 minutes on the encounter.  \par \par Verbal consent was given on 05/29/20 by Janusz Houser. \par \par Patient location: The patient was located at the primary address listed in the medical record.\par Physician location: I was located in my office in [facility].\par

## 2020-08-12 ENCOUNTER — APPOINTMENT (OUTPATIENT)
Dept: HEART AND VASCULAR | Facility: CLINIC | Age: 65
End: 2020-08-12

## 2020-08-13 ENCOUNTER — APPOINTMENT (OUTPATIENT)
Dept: HEART AND VASCULAR | Facility: CLINIC | Age: 65
End: 2020-08-13
Payer: COMMERCIAL

## 2020-08-13 VITALS — HEIGHT: 66.5 IN | WEIGHT: 142 LBS | BODY MASS INDEX: 22.55 KG/M2

## 2020-08-13 PROCEDURE — 99214 OFFICE O/P EST MOD 30 MIN: CPT | Mod: 95

## 2020-08-13 NOTE — HISTORY OF PRESENT ILLNESS
[FreeTextEntry1] : Mr. Houser presents for follow up and management of pulmonary sarcoidosis and left bundle branch block s/p PPM 07/14/15. He admits to a fair a mouth of stress at home. He and his wife have two adopted children from [place]. Both have had children without really being ready and his son has been diagnosed with bipolar disorder. On 07/09/15 he experienced a syncopal episode while standing in the kitchen. The episode was preceded by several seconds of light headedness. Then he awoke on the floor and felt back to his baseline other than a small head laceration. He then went to the ED at [facility] in [place]. He had a negative head CT and was observed for 24 hours. He had an echocardiogram, MPI stress test, CTA of the coronary arteries, and a carotid ultrasound all of which were negative per patient. He was then transferred to [facility] on Sunday secondary to LBBB and h/o sarcoidosis. He had a cardiac MR in 2016 which revealed an EF of 52% without evidence of scar or infiltration. He went on to have an EPS with Rosibel Freire MD which revealed prolongation of the HV interval with a procainamide challenge. He then went on to have a Medtronic A2DR01 (MRI compatible) dual chamber pacemaker implanted and is doing well since. His past device interrogation noted several episodes of NSVT. At present he denies any cardiovascular complaints. He continues to have episodes of "URI" and we discussed the need to follow up with his pulmonologist to rule out active pulmonary sarcoidosis. In addition, he continues to note a left sub-auricular lymph node enlargement. He underwent an echocardiogram on 06/01/17 which revealed EF 52%, trace AI, and a PPM. He is following with a pulmonologist in the sarcoidosis center at [place] and is now off prednisone.  In July, 2018 he noted his PPM pacing and his electrophysiologist reprogramed the pacemaker to allow for longer AV delays.  He has modified his diet and has lost 40 pounds as a result.  He had an exercise stress echocardiogram on\par 01/07/19 which revealed an EF of 58%, PPM, aortic sclerosis, 10.1 METS, and normal wall motion and PA pressures.  He will have the repeat echocardiogram at a local office by his home to avoid travelling into [place].   He has been doing well during the COVID crisis.

## 2020-08-13 NOTE — ASSESSMENT
[FreeTextEntry1] : 1. Sarcoidosis of lung:  Pulmonary sarcoidosis, with coincident left bundle branch block: s/p normal cardiac MRI and echocardiogram 12/08, normal repeat cardiac MRI 06/29/09, normal echo 03/04/13, normal cardiac MR, echo, stress MPI, cardiac CTA (07/2015). Follows with new pulmonologist Parmjit Lassiter  \par             - continue Symbicort\par             - continue Flonase Suspension, 50 MCG/ACT, 1 spray in each nostril\par             - f/u with pulmonologist, Parmjit [place]illa [place] [facility] [place] \par 2. Left bundle branch block: s/p syncope and + EPS with prolonged HV conduction, s/p PPM (Medtronic) 07/14/15 (Rosibel Freire MD at [facility]), + NSVT on recent interrogations. Echo 06/01/17: EF 52%, trace AI, PPM.\par             - follow up with PPM clinic at [facility]\par             - continue to monitor. \par             - will have a repeat echocardiogram at an office local to his home\par \par 3. Dyslipidemia: lab work from 01/2019 had an LDL of 165 but he was off Crestor at the time, now with optimal LDL\par             - continue Crestor 20mg po daily\par             - discussed with patient therapeutic lifestyle changes to improve lipid metabolism.\par \par 4. + TOBIN: ? sarcoidosis vs. cardiac etiology, s/p 01/07/19: exercise stress echo: EF 58%, PPM, aortic sclerosis, 10.1 METS, normal wall motion and PA pressures.\par             - f/u with pulmonologist\par \par Due to the current global COVID-19 pandemic and the recommendations for social distancing this encounter was converted from an in-person face-to-face encounter to a telehealth encounter employing the [employer], [employer], or other approved audio/video platform.  All components of the evaluation and management were performed per clinical routine with the exception of the physical exam.  The physical exam references my most recent physical exam plus any additional information provided by the patient (i.e. ambulatory vitals/weight) or inspection from the video portion of the encounter. \par \par I spent in excess of 25 minutes on the encounter.  \par \par Verbal consent was given on 05/29/20 by Janusz Houser. \par \par Patient location: The patient was located at the primary address listed in the medical record.\par Physician location: I was located in my office in [facility].\par

## 2020-08-13 NOTE — REASON FOR VISIT
[Follow-Up - Clinic] : a clinic follow-up of [FreeTextEntry1] : Diagnostic Tests:\par ---------------------------------------------\par ECG: \par 02/25/20: sinus rhythm, A-paced, V-sensed. \par 08/26/19: NSR, A-sensed, V-paced. \par 11/27/18: NSR, A-sensed, V-paced. \par 05/22/18: NSR, left bundle branch block. \par 01/03/17: NSR, left bundle branch block. \par 07/22/15: NSR, left bundle branch block.\par 04/14/15: NSR, left bundle branch block. \par 11/11/14: NSR, left bundle branch block. \par 02/20/13: NSR, left bundle branch block. \par ----------------------------------------------\par Echo: \par 01/07/19: EF 58%, aortic sclerosis, PPM.\par 06/01/17: EF 52%, trace AI, PPM.\par 07/10/15: outside lab: EF 60%, mild MR, trace AI, \par 03/04/13: EF 53%, trace TR\par 11/26/08: EF 60%, trace AI/TR\par ----------------------------------------------\par Stress:\par 01/07/19: exercise stress echo: EF 58%, PPM, aortic sclerosis, 10.1 METS, normal wall motion and PA pressures.\par 07/10/15: at [facility]: regadenoson MPI stress: EF 60% normal perfusion\par 03/19/14: exercise stress echo: EF 55%, 10.6 METS, normal wall motion and PA pressures\par -----------------------------------------------\par Carotid:\par 07/10/15: ICA b/l 1-19%\par -----------------------------------------------\par Lower extremity veins:\par 01/03/17: sono: r/o DVT: left: normal study.\par -----------------------------------------------\par CT:\par 08/14/18: chest: bilateral peribronchial consolidation with bronchiectasis, ground glass opacities

## 2020-08-13 NOTE — PHYSICAL EXAM
[General Appearance - Well Developed] : well developed [Normal Appearance] : normal appearance [Well Groomed] : well groomed [General Appearance - Well Nourished] : well nourished [General Appearance - In No Acute Distress] : no acute distress [No Deformities] : no deformities [Normal Conjunctiva] : the conjunctiva exhibited no abnormalities [Eyelids - No Xanthelasma] : the eyelids demonstrated no xanthelasmas [Normal Oral Mucosa] : normal oral mucosa [No Oral Cyanosis] : no oral cyanosis [No Oral Pallor] : no oral pallor [Normal Jugular Venous V Waves Present] : normal jugular venous V waves present [Normal Jugular Venous A Waves Present] : normal jugular venous A waves present [Respiration, Rhythm And Depth] : normal respiratory rhythm and effort [No Jugular Venous Curtis A Waves] : no jugular venous curtis A waves [Exaggerated Use Of Accessory Muscles For Inspiration] : no accessory muscle use [Heart Sounds] : normal S1 and S2 [Heart Rate And Rhythm] : heart rate and rhythm were normal [Murmurs] : no murmurs present [Abdomen Soft] : soft [Abdomen Mass (___ Cm)] : no abdominal mass palpated [Abdomen Tenderness] : non-tender [Abnormal Walk] : normal gait [Nail Clubbing] : no clubbing of the fingernails [Gait - Sufficient For Exercise Testing] : the gait was sufficient for exercise testing [Cyanosis, Localized] : no localized cyanosis [Petechial Hemorrhages (___cm)] : no petechial hemorrhages [No Venous Stasis] : no venous stasis [] : no rash [Skin Color & Pigmentation] : normal skin color and pigmentation [Skin Lesions] : no skin lesions [No Skin Ulcers] : no skin ulcer [No Xanthoma] : no  xanthoma was observed [Oriented To Time, Place, And Person] : oriented to person, place, and time [Affect] : the affect was normal [Mood] : the mood was normal [FreeTextEntry1] : + anxiety

## 2020-09-23 ENCOUNTER — FOLLOW UP (OUTPATIENT)
Dept: URBAN - METROPOLITAN AREA CLINIC 46 | Facility: CLINIC | Age: 65
End: 2020-09-23

## 2020-09-23 VITALS — HEIGHT: 60 IN

## 2020-09-23 DIAGNOSIS — H35.372: ICD-10-CM

## 2020-09-23 DIAGNOSIS — H25.13: ICD-10-CM

## 2020-09-23 DIAGNOSIS — H43.812: ICD-10-CM

## 2020-09-23 PROCEDURE — 92012 INTRM OPH EXAM EST PATIENT: CPT

## 2020-09-23 PROCEDURE — 92202 OPSCPY EXTND ON/MAC DRAW: CPT

## 2020-09-23 PROCEDURE — 92134 CPTRZ OPH DX IMG PST SGM RTA: CPT

## 2020-09-23 ASSESSMENT — VISUAL ACUITY
OD_CC: 20/40+2
OS_CC: 20/40+1

## 2020-09-23 ASSESSMENT — TONOMETRY
OD_IOP_MMHG: 12
OS_IOP_MMHG: 14

## 2021-10-25 ENCOUNTER — FOLLOW UP (OUTPATIENT)
Dept: URBAN - METROPOLITAN AREA CLINIC 46 | Facility: CLINIC | Age: 66
End: 2021-10-25

## 2021-10-25 DIAGNOSIS — H25.13: ICD-10-CM

## 2021-10-25 DIAGNOSIS — H04.123: ICD-10-CM

## 2021-10-25 DIAGNOSIS — H35.373: ICD-10-CM

## 2021-10-25 DIAGNOSIS — D86.9: ICD-10-CM

## 2021-10-25 DIAGNOSIS — H18.519: ICD-10-CM

## 2021-10-25 DIAGNOSIS — H43.812: ICD-10-CM

## 2021-10-25 PROCEDURE — 92134 CPTRZ OPH DX IMG PST SGM RTA: CPT

## 2021-10-25 PROCEDURE — 92014 COMPRE OPH EXAM EST PT 1/>: CPT

## 2021-10-25 PROCEDURE — 92202 OPSCPY EXTND ON/MAC DRAW: CPT

## 2021-10-25 ASSESSMENT — TONOMETRY
OS_IOP_MMHG: 10
OD_IOP_MMHG: 17

## 2021-10-25 ASSESSMENT — VISUAL ACUITY
OD_CC: 20/30
OS_CC: 20/40+2

## 2022-06-06 ENCOUNTER — APPOINTMENT (OUTPATIENT)
Dept: HEART AND VASCULAR | Facility: CLINIC | Age: 67
End: 2022-06-06
Payer: MEDICARE

## 2022-06-06 ENCOUNTER — NON-APPOINTMENT (OUTPATIENT)
Age: 67
End: 2022-06-06

## 2022-06-06 VITALS
DIASTOLIC BLOOD PRESSURE: 78 MMHG | BODY MASS INDEX: 27.64 KG/M2 | SYSTOLIC BLOOD PRESSURE: 131 MMHG | HEIGHT: 66.5 IN | WEIGHT: 174 LBS | HEART RATE: 92 BPM | TEMPERATURE: 98.2 F | OXYGEN SATURATION: 96 %

## 2022-06-06 VITALS — SYSTOLIC BLOOD PRESSURE: 128 MMHG | DIASTOLIC BLOOD PRESSURE: 79 MMHG

## 2022-06-06 PROCEDURE — 99214 OFFICE O/P EST MOD 30 MIN: CPT | Mod: 25

## 2022-06-06 PROCEDURE — 93000 ELECTROCARDIOGRAM COMPLETE: CPT

## 2022-06-06 RX ORDER — LEVOTHYROXINE SODIUM 125 UG/1
125 TABLET ORAL DAILY
Refills: 0 | Status: ACTIVE | COMMUNITY

## 2022-06-06 NOTE — REASON FOR VISIT
[FreeTextEntry1] : Diagnostic Tests:\par ---------------------------------------------\par ECG: \par 06/06/22: sinus rhythm, A-sensed, V-paced. \par 02/25/20: sinus rhythm, A-paced, V-sensed. \par 08/26/19: NSR, A-sensed, V-paced. \par 11/27/18: NSR, A-sensed, V-paced. \par 05/22/18: NSR, left bundle branch block. \par 01/03/17: NSR, left bundle branch block. \par 07/22/15: NSR, left bundle branch block.\par 04/14/15: NSR, left bundle branch block. \par 11/11/14: NSR, left bundle branch block. \par 02/20/13: NSR, left bundle branch block. \par ----------------------------------------------\par Echo: \par 08/21/20: EF 60%, trace AI. \par 01/07/19: EF 58%, aortic sclerosis, PPM.\par 06/01/17: EF 52%, trace AI, PPM.\par 07/10/15: outside lab: EF 60%, mild MR, trace AI, \par 03/04/13: EF 53%, trace TR\par 11/26/08: EF 60%, trace AI/TR\par ----------------------------------------------\par Stress:\par 04/29/22: adenosine MPI: large scar inferior wall with hypokinesis, EF 42%. \par 01/07/19: exercise stress echo: EF 58%, PPM, aortic sclerosis, 10.1 METS, normal wall motion and PA pressures.\par 07/10/15: at [facility]: regadenoson MPI stress: EF 60% normal perfusion\par 03/19/14: exercise stress echo: EF 55%, 10.6 METS, normal wall motion and PA pressures\par -----------------------------------------------\par Carotid:\par 07/10/15: ICA b/l 1-19%\par -----------------------------------------------\par Lower extremity veins:\par 01/03/17: sono: r/o DVT: left: normal study.\par -----------------------------------------------\par CT:\par 08/14/18: chest: bilateral peribronchial consolidation with bronchiectasis, ground glass opacities

## 2022-06-06 NOTE — HISTORY OF PRESENT ILLNESS
[FreeTextEntry1] : Mr. Houser presents for follow up and management of pulmonary sarcoidosis and left bundle branch block s/p PPM 07/14/15. He admits to a fair a mouth of stress at home. He and his wife have two adopted children from [place]. Both have had children without really being ready and his son has been diagnosed with bipolar disorder. On 07/09/15 he experienced a syncopal episode while standing in the kitchen. The episode was preceded by several seconds of light headedness. Then he awoke on the floor and felt back to his baseline other than a small head laceration. He then went to the ED at [facility] in [place]. He had a negative head CT and was observed for 24 hours. He had an echocardiogram, MPI stress test, CTA of the coronary arteries, and a carotid ultrasound all of which were negative per patient. He was then transferred to [facility] on Sunday secondary to LBBB and h/o sarcoidosis. He had a cardiac MR in 2016 which revealed an EF of 52% without evidence of scar or infiltration. He went on to have an EPS with Rosibel Freire MD which revealed prolongation of the HV interval with a procainamide challenge. He then went on to have a Medtronic A2DR01 (MRI compatible) dual chamber pacemaker implanted and is doing well since. His past device interrogation noted several episodes of NSVT. At present he denies any cardiovascular complaints. He continues to have episodes of "URI" and we discussed the need to follow up with his pulmonologist to rule out active pulmonary sarcoidosis. In addition, he continues to note a left sub-auricular lymph node enlargement. He underwent an echocardiogram on 06/01/17 which revealed EF 52%, trace AI, and a PPM. He is following with a pulmonologist in the sarcoidosis center at [place] and is now off prednisone.  In July, 2018 he noted his PPM pacing and his electrophysiologist reprogramed the pacemaker to allow for longer AV delays.  He has modified his diet and has lost 40 pounds as a result. He was last seen by me on 08/13/20.  On 04/29/22, he had an adenosine MPI which revealed a large scar inferior wall with hypokinesis and an EF of 42%. We discussed the differential diagnosis of the findings being attenuation artifact vs. a prior inferior MI.  He is scheduled to have a CCTA in the near future.  He has complaints of atypical chest pain and increased TOBIN.

## 2022-06-06 NOTE — ASSESSMENT
[FreeTextEntry1] : 1. Sarcoidosis of lung:  Pulmonary sarcoidosis, with coincident left bundle branch block: s/p normal cardiac MRI and echocardiogram 12/08, normal repeat cardiac MRI 06/29/09, normal echo 03/04/13, normal cardiac MR, echo, stress MPI, cardiac CTA (07/2015). Follows with new pulmonologist Parmjit Lassiter MD  \par             - continue Symbicort\par             - continue Flonase Suspension, 50 MCG/ACT, 1 spray in each nostril\par             - f/u with pulmonologist, Parmjit Sevilla MD at [facility]\par \par 2. Left bundle branch block: s/p syncope and + EPS with prolonged HV conduction, s/p PPM (Medtronic) 07/14/15 (Rosible Freire MD at [facility]), + NSVT on recent interrogations.\par             - follow up with PPM clinic at [facility]\par             - continue to monitor\par             - he will provide me a copy of recent echocardiogram report \par \par 3. Dyslipidemia: \par             - continue Crestor 20mg po daily\par             - discussed with patient therapeutic lifestyle changes to improve lipid metabolism\par             - patient will provide copy of recent lab work from PMD's office for my review \par \par 4. + TOBIN: ? sarcoidosis vs. cardiac etiology: ? + recent MPI stress test\par             - f/u with pulmonologist\par             - he is scheduled to have a CCTA in the near future to assess his burden of CAD\par \par \par An ECG was obtained to evaluate heart rhythm and screen for any underlying cardiac abnormalities. \par \par

## 2022-06-14 ENCOUNTER — NON-APPOINTMENT (OUTPATIENT)
Age: 67
End: 2022-06-14

## 2022-10-02 PROBLEM — R06.02 SOB (SHORTNESS OF BREATH) ON EXERTION: Status: ACTIVE | Noted: 2018-11-27

## 2022-10-02 PROBLEM — Z95.0 PRESENCE OF CARDIAC PACEMAKER: Status: ACTIVE | Noted: 2018-11-24

## 2022-10-02 PROBLEM — E78.5 DYSLIPIDEMIA: Status: ACTIVE | Noted: 2018-11-24

## 2022-10-02 PROBLEM — I44.7 LEFT BUNDLE BRANCH BLOCK (LBBB): Status: ACTIVE | Noted: 2018-11-24

## 2022-10-02 PROBLEM — D86.9 SARCOIDOSIS: Status: ACTIVE | Noted: 2018-11-24

## 2022-10-04 ENCOUNTER — APPOINTMENT (OUTPATIENT)
Dept: HEART AND VASCULAR | Facility: CLINIC | Age: 67
End: 2022-10-04

## 2022-10-04 VITALS — BODY MASS INDEX: 27 KG/M2 | HEIGHT: 66.5 IN | WEIGHT: 170 LBS

## 2022-10-04 DIAGNOSIS — R06.02 SHORTNESS OF BREATH: ICD-10-CM

## 2022-10-04 DIAGNOSIS — D86.9 SARCOIDOSIS, UNSPECIFIED: ICD-10-CM

## 2022-10-04 DIAGNOSIS — E78.5 HYPERLIPIDEMIA, UNSPECIFIED: ICD-10-CM

## 2022-10-04 DIAGNOSIS — I44.7 LEFT BUNDLE-BRANCH BLOCK, UNSPECIFIED: ICD-10-CM

## 2022-10-04 DIAGNOSIS — Z95.0 PRESENCE OF CARDIAC PACEMAKER: ICD-10-CM

## 2022-10-04 PROCEDURE — 99214 OFFICE O/P EST MOD 30 MIN: CPT | Mod: 95

## 2022-10-04 RX ORDER — ESOMEPRAZOLE MAGNESIUM 40 MG/1
40 CAPSULE, DELAYED RELEASE ORAL DAILY
Qty: 90 | Refills: 3 | Status: ACTIVE | COMMUNITY

## 2022-10-04 NOTE — ASSESSMENT
[FreeTextEntry1] : 1. Pulmonary sarcoidosis, with coincident left bundle branch block: s/p normal cardiac MRI and echocardiogram 12/08, normal repeat cardiac MRI 06/29/09, normal echo 03/04/13, normal cardiac MR, echo, stress MPI, cardiac CTA (07/2015): \par             - continue Symbicort\par             - continue Flonase Suspension, 50 MCG/ACT, 1 spray in each nostril\par             - f/u with pulmonologist, Parmjit Sevilla MD at [facility]\par \par 2. Left bundle branch block: s/p syncope and + EPS with prolonged HV conduction, s/p PPM (Medtronic) 07/14/15 (Rosibel Freire MD at [facility]), + NSVT on recent interrogations.\par             - follow up with PPM clinic at [facility]\par             - continue to monitor\par             - he will provide me a copy of recent echocardiogram and device interrogation reports\par \par 3. Dyslipidemia: \par             - continue rosuvastatin 20mg po daily\par             - discussed with patient therapeutic lifestyle changes to improve lipid metabolism\par             - patient will provide copy of recent lab work from PMD's office for my review \par \par 4. Right parotid gland swelling:\par             - follow up with Edward Aviles MD at [facility]. \par \par This encounter was performed as a telehealth encounter employing the [employer], [employer], or other approved audio/video platform.  All components of the evaluation and management were performed per clinical routine with the exception of the physical exam.  The physical exam references my most recent physical exam plus any additional information provided by the patient (i.e. ambulatory vitals/weight) or inspection from the video portion of the encounter. \par \par I spent in excess of 40 minutes on the encounter.  \par \par Verbal consent was given on 10/04/22 by Janusz Houser. \par \par Patient location: The patient was located at the primary address listed in the medical record.\par Physician location: I was located in my office in [facility]. \par \par

## 2022-10-04 NOTE — REASON FOR VISIT
[FreeTextEntry1] : Diagnostic Tests:\par ---------------------------------------------\par ECG: \par 06/06/22: sinus rhythm, A-sensed, V-paced. \par 02/25/20: sinus rhythm, A-paced, V-sensed. \par 08/26/19: NSR, A-sensed, V-paced. \par 11/27/18: NSR, A-sensed, V-paced. \par 05/22/18: NSR, left bundle branch block. \par 01/03/17: NSR, left bundle branch block. \par 07/22/15: NSR, left bundle branch block.\par 04/14/15: NSR, left bundle branch block. \par 11/11/14: NSR, left bundle branch block. \par 02/20/13: NSR, left bundle branch block. \par ----------------------------------------------\par Echo: \par 08/21/20: EF 60%, trace AI. \par 01/07/19: EF 58%, aortic sclerosis, PPM.\par 06/01/17: EF 52%, trace AI, PPM.\par 07/10/15: outside lab: EF 60%, mild MR, trace AI, \par 03/04/13: EF 53%, trace TR\par 11/26/08: EF 60%, trace AI/TR\par ----------------------------------------------\par Stress:\par 04/29/22: adenosine MPI: large scar inferior wall with hypokinesis, EF 42%. \par 01/07/19: exercise stress echo: EF 58%, PPM, aortic sclerosis, 10.1 METS, normal wall motion and PA pressures.\par 07/10/15: at [facility]: regadenoson MPI stress: EF 60% normal perfusion\par 03/19/14: exercise stress echo: EF 55%, 10.6 METS, normal wall motion and PA pressures\par -----------------------------------------------\par Carotid:\par 07/10/15: ICA b/l 1-19%\par -----------------------------------------------\par Lower extremity veins:\par 01/03/17: sono: r/o DVT: left: normal study.\par -----------------------------------------------\par CT:\par 06/13/22: CCTA: normal coronary arteries, + chronic lung opacities. \par 08/14/18: chest: bilateral peribronchial consolidation with bronchiectasis, ground glass opacities

## 2022-10-04 NOTE — HISTORY OF PRESENT ILLNESS
[FreeTextEntry1] : Mr. Houser presents for follow up and management of pulmonary sarcoidosis and left bundle branch block s/p PPM 07/14/15. He admits to a fair a mouth of stress at home. He and his wife have two adopted children from [place]. Both have had children without really being ready and his son has been diagnosed with bipolar disorder. On 07/09/15 he experienced a syncopal episode while standing in the kitchen. The episode was preceded by several seconds of light headedness. Then he awoke on the floor and felt back to his baseline other than a small head laceration. He then went to the ED at [facility] in [place]. He had a negative head CT and was observed for 24 hours. He had an echocardiogram, MPI stress test, CTA of the coronary arteries, and a carotid ultrasound all of which were negative per patient. He was then transferred to [facility] on Sunday secondary to LBBB and h/o sarcoidosis. He had a cardiac MR in 2016 which revealed an EF of 52% without evidence of scar or infiltration. He went on to have an EPS with Rosibel Freire MD which revealed prolongation of the HV interval with a procainamide challenge. He then went on to have a Medtronic A2DR01 (MRI compatible) dual chamber pacemaker implanted and is doing well since. His past device interrogation noted several episodes of NSVT. At present he denies any cardiovascular complaints. He continues to have episodes of "URI" and we discussed the need to follow up with his pulmonologist to rule out active pulmonary sarcoidosis. In addition, he continues to note a left sub-auricular lymph node enlargement. He underwent an echocardiogram on 06/01/17 which revealed EF 52%, trace AI, and a PPM. He is following with a pulmonologist in the sarcoidosis center at [place] and is now off prednisone.  In July, 2018 he noted his PPM pacing and his electrophysiologist reprogramed the pacemaker to allow for longer AV delays.  He has modified his diet and has lost 40 pounds as a result. He was last seen by me on 08/13/20.  On 04/29/22, he had an adenosine MPI which revealed a large scar inferior wall with hypokinesis and an EF of 42%.  We discussed the differential diagnosis of the findings being attenuation artifact vs. a prior inferior MI.   He went on to have a CCTA on 06/13/22 which revealed normal coronary arteries.  At present, he has complaints of chronic right parotid gland swelling and was offered an FNA biopsy and is considering it.

## 2022-11-28 NOTE — REVIEW OF SYSTEMS
[Shortness Of Breath] : shortness of breath [Dyspnea on exertion] : dyspnea during exertion [Anxiety] : anxiety [Cough] : cough [Negative] : Heme/Lymph [Recent Weight Loss (___ Lbs)] : recent [unfilled] ~Ulb weight loss ED HOLD/MedSurg

## 2024-07-15 ENCOUNTER — FOLLOW UP (OUTPATIENT)
Dept: URBAN - METROPOLITAN AREA CLINIC 46 | Facility: CLINIC | Age: 69
End: 2024-07-15

## 2024-07-15 DIAGNOSIS — H43.813: ICD-10-CM

## 2024-07-15 DIAGNOSIS — H25.13: ICD-10-CM

## 2024-07-15 DIAGNOSIS — H04.123: ICD-10-CM

## 2024-07-15 DIAGNOSIS — H18.513: ICD-10-CM

## 2024-07-15 DIAGNOSIS — H35.373: ICD-10-CM

## 2024-07-15 PROCEDURE — 92201 OPSCPY EXTND RTA DRAW UNI/BI: CPT

## 2024-07-15 PROCEDURE — 92014 COMPRE OPH EXAM EST PT 1/>: CPT

## 2024-07-15 PROCEDURE — 92134 CPTRZ OPH DX IMG PST SGM RTA: CPT

## 2024-07-15 ASSESSMENT — TONOMETRY
OD_IOP_MMHG: 11
OS_IOP_MMHG: 09

## 2024-07-15 ASSESSMENT — VISUAL ACUITY
OS_CC: 20/50
OD_CC: 20/30-2

## 2025-07-21 ENCOUNTER — PREPPED CHART (OUTPATIENT)
Dept: URBAN - METROPOLITAN AREA CLINIC 46 | Age: 70
End: 2025-07-21